# Patient Record
Sex: MALE | Race: WHITE | NOT HISPANIC OR LATINO
[De-identification: names, ages, dates, MRNs, and addresses within clinical notes are randomized per-mention and may not be internally consistent; named-entity substitution may affect disease eponyms.]

---

## 2017-02-17 DIAGNOSIS — M10.9 GOUT, UNSPECIFIED: ICD-10-CM

## 2017-02-17 DIAGNOSIS — E11.9 TYPE 2 DIABETES MELLITUS W/OUT COMPLICATIONS: ICD-10-CM

## 2017-02-17 DIAGNOSIS — I10 ESSENTIAL (PRIMARY) HYPERTENSION: ICD-10-CM

## 2017-02-17 DIAGNOSIS — E04.1 NONTOXIC SINGLE THYROID NODULE: ICD-10-CM

## 2017-02-17 DIAGNOSIS — E78.00 PURE HYPERCHOLESTEROLEMIA, UNSPECIFIED: ICD-10-CM

## 2021-03-25 ENCOUNTER — APPOINTMENT (OUTPATIENT)
Dept: OTOLARYNGOLOGY | Facility: CLINIC | Age: 61
End: 2021-03-25

## 2021-09-24 ENCOUNTER — APPOINTMENT (OUTPATIENT)
Dept: OTOLARYNGOLOGY | Facility: CLINIC | Age: 61
End: 2021-09-24
Payer: MEDICARE

## 2021-09-24 VITALS
HEART RATE: 62 BPM | BODY MASS INDEX: 41.22 KG/M2 | WEIGHT: 272 LBS | SYSTOLIC BLOOD PRESSURE: 138 MMHG | DIASTOLIC BLOOD PRESSURE: 68 MMHG | TEMPERATURE: 97 F | HEIGHT: 68 IN

## 2021-09-24 DIAGNOSIS — Z99.89 OBSTRUCTIVE SLEEP APNEA (ADULT) (PEDIATRIC): ICD-10-CM

## 2021-09-24 DIAGNOSIS — Z86.39 PERSONAL HISTORY OF OTHER ENDOCRINE, NUTRITIONAL AND METABOLIC DISEASE: ICD-10-CM

## 2021-09-24 DIAGNOSIS — Z86.69 PERSONAL HISTORY OF OTHER DISEASES OF THE NERVOUS SYSTEM AND SENSE ORGANS: ICD-10-CM

## 2021-09-24 DIAGNOSIS — Z95.5 PRESENCE OF CORONARY ANGIOPLASTY IMPLANT AND GRAFT: ICD-10-CM

## 2021-09-24 DIAGNOSIS — Z78.9 OTHER SPECIFIED HEALTH STATUS: ICD-10-CM

## 2021-09-24 DIAGNOSIS — Z87.448 PERSONAL HISTORY OF OTHER DISEASES OF URINARY SYSTEM: ICD-10-CM

## 2021-09-24 DIAGNOSIS — Z92.29 PERSONAL HISTORY OF OTHER DRUG THERAPY: ICD-10-CM

## 2021-09-24 DIAGNOSIS — I50.30 UNSPECIFIED DIASTOLIC (CONGESTIVE) HEART FAILURE: ICD-10-CM

## 2021-09-24 DIAGNOSIS — Z86.79 PERSONAL HISTORY OF OTHER DISEASES OF THE CIRCULATORY SYSTEM: ICD-10-CM

## 2021-09-24 DIAGNOSIS — E03.9 HYPOTHYROIDISM, UNSPECIFIED: ICD-10-CM

## 2021-09-24 DIAGNOSIS — G47.33 OBSTRUCTIVE SLEEP APNEA (ADULT) (PEDIATRIC): ICD-10-CM

## 2021-09-24 PROCEDURE — 69210 REMOVE IMPACTED EAR WAX UNI: CPT | Mod: RT

## 2021-09-24 PROCEDURE — 99204 OFFICE O/P NEW MOD 45 MIN: CPT | Mod: 25

## 2021-09-24 PROCEDURE — 31575 DIAGNOSTIC LARYNGOSCOPY: CPT

## 2021-09-24 RX ORDER — BENZOYL PEROXIDE 5 G/100G
5 LIQUID TOPICAL
Qty: 148 | Refills: 0 | Status: ACTIVE | COMMUNITY
Start: 2021-07-14

## 2021-09-24 RX ORDER — LEVOTHYROXINE SODIUM 0.09 MG/1
88 TABLET ORAL
Qty: 30 | Refills: 0 | Status: ACTIVE | COMMUNITY
Start: 2021-04-14

## 2021-09-24 RX ORDER — QUETIAPINE FUMARATE 25 MG/1
25 TABLET ORAL
Qty: 30 | Refills: 0 | Status: COMPLETED | COMMUNITY
Start: 2021-04-12

## 2021-09-24 RX ORDER — SENNOSIDES 8.6 MG TABLETS 8.6 MG/1
8.6 TABLET ORAL
Qty: 60 | Refills: 0 | Status: ACTIVE | COMMUNITY
Start: 2021-01-12

## 2021-09-24 RX ORDER — LANCETS 28 GAUGE
EACH MISCELLANEOUS
Refills: 0 | Status: COMPLETED | COMMUNITY

## 2021-09-24 RX ORDER — GLIMEPIRIDE 2 MG/1
2 TABLET ORAL
Refills: 0 | Status: COMPLETED | COMMUNITY

## 2021-09-24 RX ORDER — TRAZODONE HYDROCHLORIDE 100 MG/1
100 TABLET ORAL
Qty: 30 | Refills: 0 | Status: COMPLETED | COMMUNITY
Start: 2020-12-03

## 2021-09-24 RX ORDER — CHOLECALCIFEROL (VITAMIN D3) 1250 MCG
1.25 MG CAPSULE ORAL
Qty: 7 | Refills: 0 | Status: COMPLETED | COMMUNITY
Start: 2021-06-22

## 2021-09-24 RX ORDER — LEVOTHYROXINE SODIUM 0.09 MG/1
88 TABLET ORAL
Qty: 30 | Refills: 0 | Status: COMPLETED | COMMUNITY
Start: 2021-04-14

## 2021-09-24 RX ORDER — EZETIMIBE 10 MG/1
10 TABLET ORAL
Qty: 30 | Refills: 0 | Status: COMPLETED | COMMUNITY
Start: 2020-11-18

## 2021-09-24 RX ORDER — ATORVASTATIN CALCIUM 80 MG/1
80 TABLET, FILM COATED ORAL
Qty: 30 | Refills: 0 | Status: ACTIVE | COMMUNITY
Start: 2020-11-18

## 2021-09-24 RX ORDER — BUPROPION HYDROCHLORIDE 300 MG/1
300 TABLET, EXTENDED RELEASE ORAL
Qty: 30 | Refills: 0 | Status: COMPLETED | COMMUNITY
Start: 2021-04-12

## 2021-09-24 RX ORDER — LATANOPROST/PF 0.005 %
0.01 DROPS OPHTHALMIC (EYE)
Qty: 5 | Refills: 0 | Status: COMPLETED | COMMUNITY
Start: 2021-09-16

## 2021-09-24 RX ORDER — SENNOSIDES 8.6 MG TABLETS 8.6 MG/1
8.6 TABLET ORAL
Qty: 60 | Refills: 0 | Status: COMPLETED | COMMUNITY
Start: 2021-01-12

## 2021-09-24 RX ORDER — ASPIRIN 81 MG/1
81 TABLET, COATED ORAL
Qty: 30 | Refills: 0 | Status: COMPLETED | COMMUNITY
Start: 2021-05-10

## 2021-09-24 RX ORDER — TIMOLOL MALEATE 5 MG/ML
0.5 SOLUTION OPHTHALMIC
Qty: 15 | Refills: 0 | Status: COMPLETED | COMMUNITY
Start: 2021-01-07

## 2021-09-24 RX ORDER — MONTELUKAST 10 MG/1
10 TABLET, FILM COATED ORAL
Qty: 30 | Refills: 0 | Status: COMPLETED | COMMUNITY
Start: 2020-11-18

## 2021-09-24 RX ORDER — ACETAMINOPHEN 500 MG/1
500 TABLET ORAL
Qty: 30 | Refills: 0 | Status: ACTIVE | COMMUNITY
Start: 2021-09-13

## 2021-09-24 RX ORDER — LABETALOL HYDROCHLORIDE 200 MG/1
200 TABLET, FILM COATED ORAL
Qty: 60 | Refills: 0 | Status: COMPLETED | COMMUNITY
Start: 2020-09-15

## 2021-09-24 RX ORDER — BENZOYL PEROXIDE 5 G/100G
5 LIQUID TOPICAL
Qty: 148 | Refills: 0 | Status: COMPLETED | COMMUNITY
Start: 2021-07-14

## 2021-09-24 RX ORDER — FUROSEMIDE 40 MG/1
40 TABLET ORAL
Refills: 0 | Status: COMPLETED | COMMUNITY

## 2021-09-24 RX ORDER — ATORVASTATIN CALCIUM 80 MG/1
80 TABLET, FILM COATED ORAL
Qty: 30 | Refills: 0 | Status: COMPLETED | COMMUNITY
Start: 2020-11-18

## 2021-09-24 RX ORDER — MULTI VITAMIN/MINERAL SUPPLEMENT WITH ASCORBIC ACID, NIACIN, PYRIDOXINE, PANTOTHENIC ACID, FOLIC ACID, RIBOFLAVIN, THIAMIN, BIOTIN, COBALAMIN AND ZINC. 60; 20; 12.5; 10; 10; 1.7; 1.5; 1; .3; .006 MG/1; MG/1; MG/1; MG/1; MG/1; MG/1; MG/1; MG/1; MG/1; MG/1
TABLET, COATED ORAL
Qty: 30 | Refills: 0 | Status: ACTIVE | COMMUNITY
Start: 2021-03-22

## 2021-09-24 RX ORDER — FUROSEMIDE 40 MG/1
40 TABLET ORAL
Qty: 30 | Refills: 0 | Status: COMPLETED | COMMUNITY
Start: 2020-11-18

## 2021-09-24 RX ORDER — CETIRIZINE HYDROCHLORIDE 10 MG/1
10 TABLET, COATED ORAL
Refills: 0 | Status: ACTIVE | COMMUNITY

## 2021-09-24 RX ORDER — SODIUM CHLORIDE 0.65 %
0.65 AEROSOL, SPRAY (ML) NASAL
Qty: 44 | Refills: 0 | Status: ACTIVE | COMMUNITY
Start: 2021-04-14

## 2021-09-24 RX ORDER — FLUTICASONE PROPIONATE 50 UG/1
50 SPRAY, METERED NASAL
Qty: 16 | Refills: 0 | Status: COMPLETED | COMMUNITY
Start: 2021-03-08

## 2021-09-24 RX ORDER — GABAPENTIN 300 MG/1
300 CAPSULE ORAL
Refills: 0 | Status: COMPLETED | COMMUNITY

## 2021-09-24 RX ORDER — LATANOPROST/PF 0.005 %
0.01 DROPS OPHTHALMIC (EYE)
Qty: 5 | Refills: 0 | Status: ACTIVE | COMMUNITY
Start: 2021-09-16

## 2021-09-24 RX ORDER — GLIMEPIRIDE 2 MG/1
2 TABLET ORAL
Qty: 30 | Refills: 0 | Status: COMPLETED | COMMUNITY
Start: 2020-11-12

## 2021-09-24 RX ORDER — AMMONIUM LACTATE 12 %
12 CREAM (GRAM) TOPICAL
Refills: 0 | Status: ACTIVE | COMMUNITY

## 2021-09-24 RX ORDER — FLUTICASONE PROPIONATE 50 UG/1
50 SPRAY, METERED NASAL
Refills: 0 | Status: COMPLETED | COMMUNITY

## 2021-09-24 RX ORDER — CLINDAMYCIN PHOSPHATE 10 MG/ML
1 SOLUTION TOPICAL
Qty: 60 | Refills: 0 | Status: COMPLETED | COMMUNITY
Start: 2021-07-14

## 2021-09-24 RX ORDER — DOCUSATE SODIUM 100 MG/1
100 CAPSULE, LIQUID FILLED ORAL
Qty: 60 | Refills: 0 | Status: COMPLETED | COMMUNITY
Start: 2021-01-12

## 2021-09-24 RX ORDER — GABAPENTIN 300 MG/1
300 CAPSULE ORAL
Qty: 60 | Refills: 0 | Status: COMPLETED | COMMUNITY
Start: 2021-06-30

## 2021-09-24 RX ORDER — MULTIVIT-MIN/FOLIC/VIT K/LYCOP 400-300MCG
1000 TABLET ORAL
Qty: 30 | Refills: 0 | Status: ACTIVE | COMMUNITY
Start: 2021-06-30

## 2021-09-24 RX ORDER — DOCUSATE SODIUM 100 MG/1
100 CAPSULE, LIQUID FILLED ORAL
Qty: 60 | Refills: 0 | Status: ACTIVE | COMMUNITY
Start: 2021-01-12

## 2021-09-24 RX ORDER — CITALOPRAM HYDROBROMIDE 20 MG/1
20 TABLET, FILM COATED ORAL
Qty: 30 | Refills: 0 | Status: COMPLETED | COMMUNITY
Start: 2021-09-09

## 2021-09-24 RX ORDER — TIMOLOL MALEATE 5 MG/ML
0.5 SOLUTION OPHTHALMIC
Qty: 15 | Refills: 0 | Status: ACTIVE | COMMUNITY
Start: 2021-01-07

## 2021-09-24 RX ORDER — SODIUM CHLORIDE 0.65 %
0.65 AEROSOL, SPRAY (ML) NASAL
Qty: 44 | Refills: 0 | Status: COMPLETED | COMMUNITY
Start: 2021-04-14

## 2021-09-24 RX ORDER — MULTIVIT-MIN/FOLIC/VIT K/LYCOP 400-300MCG
1000 TABLET ORAL
Qty: 30 | Refills: 0 | Status: COMPLETED | COMMUNITY
Start: 2021-06-30

## 2021-09-24 RX ORDER — DOCUSATE SODIUM 100 MG/1
100 CAPSULE, LIQUID FILLED ORAL
Refills: 0 | Status: COMPLETED | COMMUNITY

## 2021-10-07 PROBLEM — E03.9 HYPOTHYROIDISM: Status: ACTIVE | Noted: 2021-10-07

## 2021-10-07 PROBLEM — Z78.9 DOES NOT USE ILLICIT DRUGS: Status: ACTIVE | Noted: 2021-10-07

## 2021-10-07 PROBLEM — I50.30 DIASTOLIC CHF: Status: RESOLVED | Noted: 2021-10-07 | Resolved: 2021-10-07

## 2021-10-07 PROBLEM — Z95.5 S/P CORONARY ARTERY STENT PLACEMENT: Status: RESOLVED | Noted: 2021-10-07 | Resolved: 2021-10-07

## 2021-10-07 PROBLEM — Z87.448 HISTORY OF CHRONIC KIDNEY DISEASE: Status: RESOLVED | Noted: 2021-10-07 | Resolved: 2021-10-07

## 2021-10-07 PROBLEM — Z86.69 HISTORY OF GLAUCOMA: Status: RESOLVED | Noted: 2021-10-07 | Resolved: 2021-10-07

## 2021-10-07 RX ORDER — FLUTICASONE PROPIONATE AND SALMETEROL 100; 50 UG/1; UG/1
100-50 POWDER RESPIRATORY (INHALATION)
Refills: 0 | Status: ACTIVE | COMMUNITY

## 2021-10-07 RX ORDER — TRAZODONE HYDROCHLORIDE 100 MG/1
100 TABLET ORAL
Refills: 0 | Status: ACTIVE | COMMUNITY
Start: 2020-12-03

## 2021-10-07 RX ORDER — GABAPENTIN 300 MG/1
300 CAPSULE ORAL TWICE DAILY
Qty: 60 | Refills: 0 | Status: ACTIVE | COMMUNITY
Start: 2021-06-30

## 2021-10-07 RX ORDER — FUROSEMIDE 40 MG/1
40 TABLET ORAL DAILY
Refills: 0 | Status: ACTIVE | COMMUNITY
Start: 2020-11-18

## 2021-10-07 RX ORDER — QUETIAPINE FUMARATE 25 MG/1
25 TABLET ORAL
Qty: 30 | Refills: 0 | Status: DISCONTINUED | COMMUNITY
Start: 2021-04-12 | End: 2021-09-24

## 2021-10-07 RX ORDER — GLIMEPIRIDE 2 MG/1
2 TABLET ORAL DAILY
Refills: 0 | Status: ACTIVE | COMMUNITY
Start: 2020-11-12

## 2021-10-07 RX ORDER — ALLOPURINOL 100 MG/1
100 TABLET ORAL DAILY
Refills: 0 | Status: ACTIVE | COMMUNITY
Start: 2020-05-14

## 2021-10-07 RX ORDER — EZETIMIBE 10 MG/1
10 TABLET ORAL DAILY
Refills: 0 | Status: ACTIVE | COMMUNITY

## 2021-10-07 NOTE — REVIEW OF SYSTEMS
[Patient Intake Form Reviewed] : Patient intake form was reviewed [Negative] : Heme/Lymph [Seasonal Allergies] : seasonal allergies [As Noted in HPI] : as noted in HPI

## 2021-10-31 PROBLEM — Z86.39 HISTORY OF HYPOTHYROIDISM: Status: RESOLVED | Noted: 2021-10-31 | Resolved: 2021-10-31

## 2021-10-31 NOTE — CONSULT LETTER
[Dear  ___] : Dear  [unfilled], [Courtesy Letter:] : I had the pleasure of seeing your patient, [unfilled], in my office today. [Please see my note below.] : Please see my note below. [Sincerely,] : Sincerely, [FreeTextEntry2] : Dr. Misty Rashid \par [address]\par [place] [id number] [FreeTextEntry3] : \par Linda Batres MD \par Otolaryngology, Head and Neck Surgery \par \par

## 2021-10-31 NOTE — DATA REVIEWED
[de-identified] : \par AUDIOGRAM (01/15/2014)\par RIGHT:  Hearing within normal limits.    \par LEFT:   Hearing within normal limits. \par Tympanograms  A  bilateral    \par Word recognition 100%  bilateral   \par

## 2021-10-31 NOTE — PHYSICAL EXAM
[Midline] : trachea located in midline position [Laryngoscopy Performed] : laryngoscopy was performed, see procedure section for findings [Normal] : no rashes [Removed] : palatine tonsils previously removed [FreeTextEntry1] : No hoarseness.  [de-identified] : No thyroid enlargement/mass.  Well-healed thyroidectomy scar. [de-identified] : RIGHT impacted cerumen removed with suction after H2O2 instillation. LEFT EAC clear. [de-identified] :  Low mid septal perforation, small, clean. [de-identified] : Mallampati  4 airway [de-identified] : s/p palatopharyngoplasty [de-identified] : Carotid pulses 2+ bilateral.

## 2021-10-31 NOTE — HISTORY OF PRESENT ILLNESS
[de-identified] : Mr. NAVARRO is a 61 year old man who presents for several ENT issues.  I last treated him in 2016.\par PMH:  HTN, HLD, DM, CAD s/p MI and PCI, diastolic CHF, CKD, hypothyroid, s/p right thyroidectomy (MNG), YANELI on CPAP, gout\par PCP:  Misty Rashid MD\par \par He is having popping noises in his ears for past 6 months, along with some muffled hearing, occasional pain, occasional\par ringing. His doctor looked in his ear and saw wax. \par He coughs up phlegm on and off for a few years and also gets irritation inside his throat.  He tried Mucinex with a little improvement.  No postnasal drip, runny nose or facial pressure.  No hoarseness.  No history of reflux.\par He was allergy tested a few years ago but not sure to what he is allergic. He is on montelukast, cetirizine and fluticasone daily.\par Was on CPAP for YANELI but needs new equipment.\par S/p right thyroid lobectomy for multinodular goiter in 2013.  Path with follicular adenoma.\par His nephew Owen practices pain management. \par \par

## 2021-10-31 NOTE — ASSESSMENT
[FreeTextEntry1] : Mr. NAVARRO was evaluated for the following issues today:\par \par 1.) Right ear clogging relieved after removal of impacted cerumen\par \par 2.) throat clearing and cough likely due to reflux\par --> reflux precautions were reviewed with him\par \par 3.) YANELI was on CPAP.  Needs new equipment.  Last sleep study over 3 years ago.\par S/p UPPP years ago.\par --> new CPAP or split night study\par \par Return in 6 months

## 2022-05-25 ENCOUNTER — NON-APPOINTMENT (OUTPATIENT)
Age: 62
End: 2022-05-25

## 2022-05-25 ENCOUNTER — APPOINTMENT (OUTPATIENT)
Dept: OTOLARYNGOLOGY | Facility: CLINIC | Age: 62
End: 2022-05-25

## 2022-05-25 VITALS
DIASTOLIC BLOOD PRESSURE: 96 MMHG | BODY MASS INDEX: 43.16 KG/M2 | HEART RATE: 56 BPM | TEMPERATURE: 96.9 F | HEIGHT: 67 IN | SYSTOLIC BLOOD PRESSURE: 180 MMHG | WEIGHT: 275 LBS

## 2022-05-25 DIAGNOSIS — H61.21 IMPACTED CERUMEN, RIGHT EAR: ICD-10-CM

## 2022-05-25 DIAGNOSIS — H93.8X1 OTHER SPECIFIED DISORDERS OF RIGHT EAR: ICD-10-CM

## 2022-05-25 DIAGNOSIS — K21.9 GASTRO-ESOPHAGEAL REFLUX DISEASE W/OUT ESOPHAGITIS: ICD-10-CM

## 2022-05-25 DIAGNOSIS — J31.2 CHRONIC PHARYNGITIS: ICD-10-CM

## 2022-05-25 PROCEDURE — 31575 DIAGNOSTIC LARYNGOSCOPY: CPT

## 2022-05-25 PROCEDURE — 99213 OFFICE O/P EST LOW 20 MIN: CPT | Mod: 25

## 2022-05-25 PROCEDURE — XXXXX: CPT | Mod: 1L

## 2022-05-25 RX ORDER — OMEPRAZOLE 20 MG/1
20 CAPSULE, DELAYED RELEASE ORAL DAILY
Qty: 30 | Refills: 2 | Status: ACTIVE | COMMUNITY
Start: 2022-05-25 | End: 1900-01-01

## 2022-05-25 RX ORDER — FAMOTIDINE 20 MG/1
20 TABLET, FILM COATED ORAL
Refills: 0 | Status: ACTIVE | COMMUNITY

## 2022-11-17 ENCOUNTER — APPOINTMENT (OUTPATIENT)
Dept: OTOLARYNGOLOGY | Facility: CLINIC | Age: 62
End: 2022-11-17

## 2022-11-17 NOTE — HISTORY OF PRESENT ILLNESS
[de-identified] : Mr. NAVARRO is a 61 year old man who presents for ENT checkup\par PMH:  HTN, HLD, DM, CAD s/p MI and PCI, diastolic CHF, CKD, hypothyroid, s/p right thyroidectomy (MNG), YANELI on CPAP, gout, chronic back pain\par PCP:  Misty Rashid MD\par \par He got a sleep study at [facility], but unsure of the results. He is scheduled for CPAP titration next month.\par He spits out a lot phlegm for a few years. His PCP started him on Famotidine.   No heartburn of stomach burning.\par His throat feels irritated and scratchy for over a year.   Often gets a tickle in his throat.  \par Every time he eats, he has a runny nose.   Currently on fluticasone spray and cetirizine.\par No changes in hearing, no nasal congestion and no postnasal drip.\par S/p right thyroid lobectomy for multinodular goiter in 2013. Path with follicular adenoma.  On LT4.\par \par VISIT 9/24/2021\par He is having popping noises in his ears for past 6 months, along with some muffled hearing, occasional pain, occasional\par ringing. His doctor looked in his ear and saw wax. \par He coughs up phlegm on and off for a few years and also gets irritation inside his throat. He tried Mucinex with a little improvement. No postnasal drip, runny nose or facial pressure. No hoarseness. No history of reflux.\par He was allergy tested a few years ago but not sure to what he is allergic. He is on montelukast, cetirizine and fluticasone daily.\par Was on CPAP for YANELI but needs new equipment.\par S/p right thyroid lobectomy for multinodular goiter in 2013. Path with follicular adenoma.\par His nephew Owen practices pain management. \par \par \par \par

## 2022-11-17 NOTE — ASSESSMENT
[FreeTextEntry1] : Mr. NAVARRO was evaluated for the following issues today:\par \par 1.) YANELI, was on CPAP in past but needed new equipment.  Recent sleep study done at [facility].\par S/p UPPP.\par --> new CPAP titration to be done\par --> request recent sleep study report\par \par 2.) throat clearing and cough x over 8 months is likely due to reflux.  Has not changed since started famotidine 2 months ago.\par --> reflux precautions were reviewed with him again\par --> continue famotidine at bedtime\par --> add omeprazole 20 mg in AM\par \par 3.) Vasomotor rhinitis\par --> continue nasal spray\par \par Return in 6 months

## 2022-11-17 NOTE — CONSULT LETTER
[Dear  ___] : Dear  [unfilled], [Courtesy Letter:] : I had the pleasure of seeing your patient, [unfilled], in my office today. [Please see my note below.] : Please see my note below. [Sincerely,] : Sincerely, [FreeTextEntry2] : Dr. Misty Rashdi \par [address]\par [place] [phone] [FreeTextEntry3] : \par Linda Batres MD \par Otolaryngology, Head and Neck Surgery \par \par

## 2022-11-17 NOTE — PHYSICAL EXAM
[] : septum deviated to the left [Midline] : trachea located in midline position [Removed] : palatine tonsils previously removed [Laryngoscopy Performed] : laryngoscopy was performed, see procedure section for findings [Normal] : no neck adenopathy [FreeTextEntry1] : No hoarseness  [de-identified] : Thyroidectomy scar healed.  No thyroid mass/nodule noted. [de-identified] : Low nasal septal perforation, clean.  [de-identified] : Dry dorsal tongue. [de-identified] : S/p UPPP

## 2022-11-17 NOTE — PROCEDURE
[FreeTextEntry6] : \par Indication:  Allergic rhinitis\par -Verbal consent was obtained from patient prior to exam. \par - Kervin-Synephrine and lidocaine 2% spray applied to nose bilaterally.\par Nasal endoscopy was performed with flexible  scope.\par Findings: \par -- Inferior turbinates mildly edematous  bilateral.  Inferior meatus clear bilateral.\par -- Septum was deviated to the left, with a low septal perforation, clean\par -- No polyps either side nose\par -- Mucus clear bilateral\par -- Middle and superior turbinates normal bilateral\par -- Middle meatus clear bilateral.  SER clear bilateral.\par -- Nasopharynx without mass or exudate\par -- Adenoids were small\par -- Eustachian orifices were clear bilateral\par \par The patient tolerated the procedure well.\par   [de-identified] : \par Indication:  globus sensation\par -Verbal consent was obtained from patient prior to procedure.\par -Kervin-Synephrine and lidocaine 2% spray applied to the nasal cavities.\par Flexible laryngoscopy was performed via right nostril and revealed the following:\par   -- Nasopharynx had no mass or exudate.  Small adenoids.\par   -- Base of tongue was symmetric and not enlarged.\par   -- Vallecula was clear\par   -- Epiglottis, both aryepiglottic folds and both false vocal folds were normal\par   -- Arytenoids both with moderate edema and erythema \par   -- True vocal folds were fully mobile and without lesions. \par   -- Post cricoid area was clear.\par   -- Interarytenoid edema was present.\par   -- No lesions seen in laryngopharynx\par The patient tolerated the procedure well.\par \par

## 2022-11-17 NOTE — HISTORY OF PRESENT ILLNESS
[de-identified] : PMH:  HTN, HLD, DM, CAD s/p MI and PCI, diastolic CHF, CKD, hypothyroid, s/p right thyroidectomy (MNG), YANELI on CPAP, gout, chronic back pain\par PCP:  Misty Rashid MD\par \par Mr. NAVARRO \par \par \par \par \par VISIT 5/25/2022\par Mr. NAVARRO is a 61 year old man who presents for ENT checkup\par He got a sleep study at [facility], but unsure of the results. He is scheduled for CPAP titration next month.\par He spits out a lot phlegm for a few years. His PCP started him on Famotidine.   No heartburn of stomach burning.\par His throat feels irritated and scratchy for over a year.   Often gets a tickle in his throat.  \par Every time he eats, he has a runny nose.   Currently on fluticasone spray and cetirizine.\par No changes in hearing, no nasal congestion and no postnasal drip.\par S/p right thyroid lobectomy for multinodular goiter in 2013. Path with follicular adenoma.  On LT4.\par \par VISIT 9/24/2021\par He is having popping noises in his ears for past 6 months, along with some muffled hearing, occasional pain, occasional\par ringing. His doctor looked in his ear and saw wax. \par He coughs up phlegm on and off for a few years and also gets irritation inside his throat. He tried Mucinex with a little improvement. No postnasal drip, runny nose or facial pressure. No hoarseness. No history of reflux.\par He was allergy tested a few years ago but not sure to what he is allergic. He is on montelukast, cetirizine and fluticasone daily.\par Was on CPAP for YANELI but needs new equipment.\par S/p right thyroid lobectomy for multinodular goiter in 2013. Path with follicular adenoma.\par His nephew Owen practices pain management. \par \par \par SLEEP STUDY (02/23/2022) at [facility]:\par - Severe YANELI ()\par - O2 saturation baseline in high 80s-mid 90s; tanna was 72%\par - One lead EKG channel revealed occasional isolated wide-complex and narrow complex premature beats.\par

## 2022-11-17 NOTE — CONSULT LETTER
[Dear  ___] : Dear  [unfilled], [Courtesy Letter:] : I had the pleasure of seeing your patient, [unfilled], in my office today. [Please see my note below.] : Please see my note below. [Sincerely,] : Sincerely, [FreeTextEntry2] : Dr. Misty Rashid \par [address]\par [place] [id number] [FreeTextEntry3] : \par Linda Batres MD \par Otolaryngology, Head and Neck Surgery \par \par

## 2022-11-17 NOTE — DATA REVIEWED
[de-identified] : \par AUDIOGRAM (01/15/2014)\par RIGHT:  Hearing within normal limits.    \par LEFT:   Hearing within normal limits. \par Tympanograms  A  bilateral    \par Word recognition 100%  bilateral   \par

## 2022-12-13 ENCOUNTER — RX ONLY (RX ONLY)
Age: 62
End: 2022-12-13

## 2022-12-13 ENCOUNTER — OFFICE (OUTPATIENT)
Dept: URBAN - METROPOLITAN AREA CLINIC 76 | Facility: CLINIC | Age: 62
Setting detail: OPHTHALMOLOGY
End: 2022-12-13
Payer: MEDICARE

## 2022-12-13 DIAGNOSIS — H40.053: ICD-10-CM

## 2022-12-13 DIAGNOSIS — E11.3311: ICD-10-CM

## 2022-12-13 DIAGNOSIS — H25.13: ICD-10-CM

## 2022-12-13 DIAGNOSIS — E11.3312: ICD-10-CM

## 2022-12-13 PROCEDURE — 99204 OFFICE O/P NEW MOD 45 MIN: CPT | Performed by: OPHTHALMOLOGY

## 2022-12-13 ASSESSMENT — CONFRONTATIONAL VISUAL FIELD TEST (CVF)
OD_FINDINGS: FULL
OS_FINDINGS: FULL

## 2022-12-13 ASSESSMENT — VISUAL ACUITY
OD_BCVA: 20/70+2
OS_BCVA: CF 1FT

## 2023-08-02 ENCOUNTER — APPOINTMENT (OUTPATIENT)
Dept: OTOLARYNGOLOGY | Facility: CLINIC | Age: 63
End: 2023-08-02

## 2023-08-23 ENCOUNTER — APPOINTMENT (OUTPATIENT)
Dept: OTOLARYNGOLOGY | Facility: CLINIC | Age: 63
End: 2023-08-23
Payer: MEDICARE

## 2023-08-23 VITALS
HEIGHT: 67 IN | HEART RATE: 65 BPM | TEMPERATURE: 97.9 F | WEIGHT: 275 LBS | DIASTOLIC BLOOD PRESSURE: 71 MMHG | SYSTOLIC BLOOD PRESSURE: 136 MMHG | BODY MASS INDEX: 43.16 KG/M2

## 2023-08-23 DIAGNOSIS — Z86.79 PERSONAL HISTORY OF OTHER DISEASES OF THE CIRCULATORY SYSTEM: ICD-10-CM

## 2023-08-23 DIAGNOSIS — J02.9 ACUTE PHARYNGITIS, UNSPECIFIED: ICD-10-CM

## 2023-08-23 DIAGNOSIS — M51.26 OTHER INTERVERTEBRAL DISC DISPLACEMENT, LUMBAR REGION: ICD-10-CM

## 2023-08-23 DIAGNOSIS — R09.89 OTHER SPECIFIED SYMPTOMS AND SIGNS INVOLVING THE CIRCULATORY AND RESPIRATORY SYSTEMS: ICD-10-CM

## 2023-08-23 DIAGNOSIS — J34.1 CYST AND MUCOCELE OF NOSE AND NASAL SINUS: ICD-10-CM

## 2023-08-23 PROCEDURE — 31231 NASAL ENDOSCOPY DX: CPT

## 2023-08-23 PROCEDURE — 99214 OFFICE O/P EST MOD 30 MIN: CPT | Mod: 25

## 2023-08-23 RX ORDER — MONTELUKAST 10 MG/1
10 TABLET, FILM COATED ORAL
Refills: 0 | Status: DISCONTINUED | COMMUNITY
End: 2023-08-23

## 2023-09-04 PROBLEM — R09.89 THROAT CLEARING: Status: ACTIVE | Noted: 2021-09-24

## 2023-09-04 PROBLEM — J34.1 MUCOUS RETENTION CYST OF MAXILLARY SINUS: Status: ACTIVE | Noted: 2023-09-04

## 2023-09-04 PROBLEM — J02.9 ACUTE PHARYNGITIS, UNSPECIFIED ETIOLOGY: Status: ACTIVE | Noted: 2023-09-04 | Resolved: 2023-10-04

## 2023-09-04 NOTE — DATA REVIEWED
[de-identified] : \par  AUDIOGRAM (01/15/2014)\par  RIGHT:  Hearing within normal limits.    \par  LEFT:   Hearing within normal limits. \par  Tympanograms  A  bilateral    \par  Word recognition 100%  bilateral   \par

## 2023-09-04 NOTE — PROCEDURE
[FreeTextEntry6] :  Indication:  chronic maxillary sinusitis -Verbal consent was obtained from patient prior to exam.  - Oxymetazoline 0.05% spray applied to nose bilaterally. Nasal endoscopy was performed with flexible scope. Findings:  -- Inferior turbinates mildly edematous & boggy bilateral.  Inferior meatus clear bilateral. -- Septum was midline, with low mid perforation -- No polyps either side nose -- Mucus clear bilateral -- Middle and superior turbinates normal bilateral -- Middle meatus clear bilateral.  SER clear bilateral. -- Nasopharynx without mass or exudate.  Adenoids were small. -- Eustachian orifices were clear bilateral  The patient tolerated the procedure well.   [de-identified] :  Indication:  pharyngitis -Verbal consent was obtained from patient prior to procedure. -Oxymetazoline 0.05% and lidocaine 2% spray applied to the nasal cavities. Flexible laryngoscopy was performed via left nostril and revealed the following:   -- Nasopharynx had no mass or exudate.   -- Right carotid artery pulsation retropharyngeal.  Cobblestoning on posterior pharyngeal wall.    -- Base of tongue was symmetric and not enlarged.   -- Vallecula was clear   -- Epiglottis, both aryepiglottic folds and both false vocal folds were normal   -- Arytenoids both with mild edema and no erythema    -- True vocal folds were fully mobile and without lesions.    -- Post cricoid area was clear.   -- Interarytenoid edema was present.   -- No lesions seen in laryngopharynx  The patient tolerated the procedure well.

## 2023-09-04 NOTE — HISTORY OF PRESENT ILLNESS
[de-identified] : PMH:  HTN, HLD, DM, CAD s/p MI and PCI, diastolic CHF, CKD, hypothyroid, s/p right thyroidectomy (MNG), YANELI on CPAP, gout, chronic back pain PCP:  Misty Rashid MD  Mr. NAVARRO is a 63-year-old man who presents for evaluation of a sinus polyp. He had MRI brain as part of evaluation for Parkinson's disease after developing a hand tremor.  The MRI showed a polyp in the left maxillary sinus. He has frequent snoring and clear nasal mucus bilateral.  No facial pain/pressure.  He has known YANELI and a septal perforation. He has scratchy irritated throat x a few days.  No fever, trouble swallowing or hoarseness. He is going to have cataract surgery soon.  VISIT [id number] He got a sleep study at [facility], but unsure of the results. He is scheduled for CPAP titration next month. He spits out a lot phlegm for a few years. His PCP started him on Famotidine.   No heartburn of stomach burning. His throat feels irritated and scratchy for over a year.   Often gets a tickle in his throat.   Every time he eats, he has a runny nose.   Currently on fluticasone spray and cetirizine. No changes in hearing, no nasal congestion and no postnasal drip. S/p right thyroid lobectomy for multinodular goiter in 2013. Path with follicular adenoma.  On LT4.  VISIT 9/24/2021 He is having popping noises in his ears for past 6 months, along with some muffled hearing, occasional pain, occasional ringing. His doctor looked in his ear and saw wax.  He coughs up phlegm on and off for a few years and also gets irritation inside his throat. He tried Mucinex with a little improvement. No postnasal drip, runny nose or facial pressure. No hoarseness. No history of reflux. He was allergy tested a few years ago but not sure to what he is allergic. He is on montelukast, cetirizine and fluticasone daily. Was on CPAP for YANELI but needs new equipment. S/p right thyroid lobectomy for multinodular goiter in 2013. Path with follicular adenoma. His nephew Owen practices pain management.    MRI BRAIN without contrast (8/10/2023) at [facility] - Comparison:  MRI brain 12/11/2004 - Mild prominence of the cerebral sulci are noted bilaterally suggesting mild volume loss in conjunction with involutional change.  Mild prominence of the extra-axial spaces is noted anterior to the frontal lobes bilaterally suggestive of mild volume loss in conjunction with involutional change. There are no additional findings to indicate an asymmetric parietal or temporal atrophy.  There is no evidence of hydrocephalus.  There is no evidence of dilatation of the lateral, third or fourth ventricles or temporal horns. - No evidence of signal elevation within the periventricular white matter. - No evidence of actue intracranial hemorrhage or subdural hematoma.  No evidence of acute teritorial infarct or acute brainstem infarct.  No evidence of intracranial mass lesions, associaed mass effect or midline shift.  No evidence of mass within the suprasellar region or craniocervical junction.  No evidence of structural or signal abrnomalities within the medial trmpoal lobes. - 2.6 cm left maxillary sinus mucosal retention cyst or polyp is noted.  A minor right maxillary sinus mucosal thickening is also noted.  No additional paranasal sinus chance to indicate opacification or air-fluid level. - Mild bilateral mastoid air cell mucosal thickening is noted. - Cavernous sinuses are symmetric.  No evidence of retro-orbital mass lesion or ocular asymmetry.  No evidence of restricted diffusion.  Trigeminal nerves are symmetric. - Right occipital-calvaria arachnoid granulation is suggested. - Examination is comparted to previous MRI brain dated 12/11/2004.  Left maxillary sinus mucosal retention cyst or polyp currently measures 2.6 cm compared to 1.7 cm on prior exam. (Images were reviewed on CD, which was returned to patient -- retention cyst is inferiorly based, does not obstruct sinus outflow.)

## 2023-09-04 NOTE — CONSULT LETTER
[Dear  ___] : Dear  [unfilled], [Courtesy Letter:] : I had the pleasure of seeing your patient, [unfilled], in my office today. [Please see my note below.] : Please see my note below. [Consult Closing:] : Thank you very much for allowing me to participate in the care of this patient.  If you have any questions, please do not hesitate to contact me. [Sincerely,] : Sincerely, [FreeTextEntry2] : Dr. Misty Rashid \par  [address]\par  [place] [id number] [FreeTextEntry3] : \par  Linda Batres MD \par  Otolaryngology, Head and Neck Surgery \par  \par

## 2023-09-04 NOTE — PHYSICAL EXAM
[FreeTextEntry1] : No hoarseness.  [de-identified] : Thyroidectomy scar well-healed.  No nodule/enlargement thyroid on palpation. [de-identified] : Smal septal perforation, clean. [Midline] : trachea located in midline position [Removed] : palatine tonsils previously removed [Laryngoscopy Performed] : laryngoscopy was performed, see procedure section for findings [de-identified] : S/p UPPP [Normal] : no neck adenopathy

## 2023-09-04 NOTE — ASSESSMENT
[FreeTextEntry1] : Mr. NAVARRO was evaluated for the following issues today:   1.)  Chronic allergic rhinitis, with inferior turbinate hypertrophy. and stable small mid low septal perforation Scratchy throat likely due to postnasal drip. --> restart fluticasone nasal spray and ipratropium spray  2.)  2.6 cm cyst in left maxillary cyst is inferiorly-based and nonobstrucive of sinus outflow; sinus has minimal mucosal thickening,  Cyst, which is asymptomatic, was 1.7 cm on 2004 MRI brain. No surgical intervention is recommended.  I would be happy to see him again as needed.

## 2023-09-20 ENCOUNTER — RX CHANGE (OUTPATIENT)
Age: 63
End: 2023-09-20

## 2023-09-20 RX ORDER — IPRATROPIUM BROMIDE 21 UG/1
0.03 SPRAY NASAL 4 TIMES DAILY
Qty: 1 | Refills: 5 | Status: DISCONTINUED | COMMUNITY
Start: 2022-05-25 | End: 2023-09-20

## 2023-09-20 RX ORDER — FLUTICASONE PROPIONATE 50 UG/1
50 SPRAY, METERED NASAL DAILY
Qty: 1 | Refills: 3 | Status: DISCONTINUED | COMMUNITY
Start: 2021-03-08 | End: 2023-09-20

## 2023-09-20 RX ORDER — IPRATROPIUM BROMIDE 21 UG/1
0.03 SPRAY NASAL
Qty: 3 | Refills: 3 | Status: ACTIVE | COMMUNITY
Start: 1900-01-01 | End: 1900-01-01

## 2024-04-24 ENCOUNTER — APPOINTMENT (OUTPATIENT)
Dept: OTOLARYNGOLOGY | Facility: CLINIC | Age: 64
End: 2024-04-24
Payer: MEDICARE

## 2024-04-24 VITALS
HEIGHT: 68 IN | WEIGHT: 245 LBS | SYSTOLIC BLOOD PRESSURE: 103 MMHG | HEART RATE: 55 BPM | TEMPERATURE: 96.8 F | BODY MASS INDEX: 37.13 KG/M2 | DIASTOLIC BLOOD PRESSURE: 66 MMHG

## 2024-04-24 DIAGNOSIS — J30.0 VASOMOTOR RHINITIS: ICD-10-CM

## 2024-04-24 DIAGNOSIS — R09.89 OTHER SPECIFIED SYMPTOMS AND SIGNS INVOLVING THE CIRCULATORY AND RESPIRATORY SYSTEMS: ICD-10-CM

## 2024-04-24 DIAGNOSIS — J34.89 OTHER SPECIFIED DISORDERS OF NOSE AND NASAL SINUSES: ICD-10-CM

## 2024-04-24 DIAGNOSIS — R09.A2 FOREIGN BODY SENSATION, THROAT: ICD-10-CM

## 2024-04-24 DIAGNOSIS — J30.89 OTHER ALLERGIC RHINITIS: ICD-10-CM

## 2024-04-24 DIAGNOSIS — G47.33 OBSTRUCTIVE SLEEP APNEA (ADULT) (PEDIATRIC): ICD-10-CM

## 2024-04-24 PROCEDURE — 31231 NASAL ENDOSCOPY DX: CPT

## 2024-04-24 PROCEDURE — 99214 OFFICE O/P EST MOD 30 MIN: CPT | Mod: 25

## 2024-04-24 RX ORDER — FLUTICASONE PROPIONATE 50 UG/1
50 SPRAY, METERED NASAL
Qty: 48 | Refills: 3 | Status: ACTIVE | COMMUNITY
Start: 1900-01-01 | End: 1900-01-01

## 2024-04-24 RX ORDER — CLINDAMYCIN PHOSPHATE 10 MG/ML
1 SOLUTION TOPICAL
Qty: 60 | Refills: 0 | Status: COMPLETED | COMMUNITY
Start: 2021-07-14 | End: 2024-04-24

## 2024-04-24 RX ORDER — CARBIDOPA AND LEVODOPA 25; 250 MG/1; MG/1
TABLET ORAL
Refills: 0 | Status: ACTIVE | COMMUNITY

## 2024-04-24 RX ORDER — CHOLECALCIFEROL (VITAMIN D3)
CRYSTALS MISCELLANEOUS
Refills: 0 | Status: COMPLETED | COMMUNITY
End: 2024-04-24

## 2024-04-28 PROBLEM — R09.A2 GLOBUS SENSATION: Status: ACTIVE | Noted: 2022-05-25

## 2024-04-28 PROBLEM — J30.89 ALLERGIC RHINITIS DUE TO OTHER ALLERGIC TRIGGER: Status: ACTIVE | Noted: 2023-08-23

## 2024-04-28 PROBLEM — J34.89 NASAL SEPTAL PERFORATION: Status: ACTIVE | Noted: 2022-05-25

## 2024-04-28 NOTE — HISTORY OF PRESENT ILLNESS
[de-identified] :  PMH: HTN, HLD, DM, CAD s/p MI and PCI, diastolic CHF, CKD, hypothyroid, s/p right thyroidectomy (MNG), YANELI on CPAP, gout, chronic back pain PCP: Misty Rashid MD  Mr. NAVARRO is here for an ENT checkup. He never got CPAP.  Severe YANELI on sleep study at [facility] in 2022. Has throat clearing and feels phlegm in throat.  Feels postnasal drip and rhinorrhea when he eats sometimes.   On PPI and H2 blocker for GERD. He was diagnosed with Parkinson disease last year, now on carbidopa-levodopa.  VISIT 8/23/2023 Mr. NAVARRO is a 63-year-old man who presents for evaluation of a sinus polyp. He had MRI brain as part of evaluation for Parkinson's disease after developing a hand tremor. The MRI showed a polyp in the left maxillary sinus. He has frequent snoring and clear nasal mucus bilateral. No facial pain/pressure. He has known YANELI and a septal perforation. He has scratchy irritated throat x a few days. No fever, trouble swallowing or hoarseness. He is going to have cataract surgery soon.  VISIT [id number] He got a sleep study at [facility], but unsure of the results. He is scheduled for CPAP titration next month. He spits out a lot phlegm for a few years. His PCP started him on Famotidine. No heartburn of stomach burning. His throat feels irritated and scratchy for over a year. Often gets a tickle in his throat. Every time he eats, he has a runny nose. Currently on fluticasone spray and cetirizine. No changes in hearing, no nasal congestion and no postnasal drip. S/p right thyroid lobectomy for multinodular goiter in 2013. Path with follicular adenoma. On LT4.  VISIT 9/24/2021 He is having popping noises in his ears for past 6 months, along with some muffled hearing, occasional pain, occasional ringing. His doctor looked in his ear and saw wax. He coughs up phlegm on and off for a few years and also gets irritation inside his throat. He tried Mucinex with a little improvement. No postnasal drip, runny nose or facial pressure. No hoarseness. No history of reflux. He was allergy tested a few years ago but not sure to what he is allergic. He is on montelukast, cetirizine and fluticasone daily. Was on CPAP for YANELI but needs new equipment. S/p right thyroid lobectomy for multinodular goiter in 2013. Path with follicular adenoma. His nephew Owen practices pain management.   MRI BRAIN without contrast (8/10/2023) at [facility] - Comparison: MRI brain 12/11/2004 - Mild prominence of the cerebral sulci are noted bilaterally suggesting mild volume loss in conjunction with involutional change. Mild prominence of the extra-axial spaces is noted anterior to the frontal lobes bilaterally suggestive of mild volume loss in conjunction with involutional change. There are no additional findings to indicate an asymmetric parietal or temporal atrophy. There is no evidence of hydrocephalus. There is no evidence of dilatation of the lateral, third or fourth ventricles or temporal horns. - No evidence of signal elevation within the periventricular white matter. - No evidence of actue intracranial hemorrhage or subdural hematoma. No evidence of acute teritorial infarct or acute brainstem infarct. No evidence of intracranial mass lesions, associaed mass effect or midline shift. No evidence of mass within the suprasellar region or craniocervical junction. No evidence of structural or signal abrnomalities within the medial trmpoal lobes. - 2.6 cm left maxillary sinus mucosal retention cyst or polyp is noted. A minor right maxillary sinus mucosal thickening is also noted. No additional paranasal sinus chance to indicate opacification or air-fluid level. - Mild bilateral mastoid air cell mucosal thickening is noted. - Cavernous sinuses are symmetric. No evidence of retro-orbital mass lesion or ocular asymmetry. No evidence of restricted diffusion. Trigeminal nerves are symmetric. - Right occipital-calvaria arachnoid granulation is suggested. - Examination is comparted to previous MRI brain dated 12/11/2004. Left maxillary sinus mucosal retention cyst or polyp currently measures 2.6 cm compared to 1.7 cm on prior exam. (Images were reviewed on CD, which was returned to patient -- retention cyst is inferiorly based, does not obstruct sinus outflow.)   SLEEP STUDY (02/23/2022) at [facility] - Severe YANELI -  - Snoring noted - O2 sat mean 89%, tanna 73%.  O2 sat +/< 88% for 139.4 minutes of sleep time. - EKG on one lead channel revealed occasional isolated wide complex and narrow complex premature beats

## 2024-04-28 NOTE — PROCEDURE
[FreeTextEntry6] : NASAL ENDOSCOPY Indication:  chronic sinusitis -Verbal consent was obtained from patient prior to exam.  - Oxymetazoline 0.05% spray applied to nose bilaterally. Nasal endoscopy was performed with flexible scope. Findings:  -- Inferior turbinate mildly edematous bilateral.  Inferior meatus clear bilateral. -- Septum was mildly deviated to the left.  Small posterior inferior septal perforation, dry, clean. -- No polyps either side nose -- Mucus clear, thick and sticky bilateral -- Middle and superior turbinate normal bilateral -- Middle meatus clear bilateral.  SER clear bilateral. The patient tolerated the procedure well.   [de-identified] : LARYNGOSCOPY EXAM:  Indication:  throat clearing -Verbal consent was obtained from patient prior to procedure. -Oxymetazoline 0.05% spray applied to the nasal cavities. Flexible laryngoscopy was performed via left nostril and revealed the following:   -- Nasopharynx had no mass or exudate.   -- Mild cobblestoning on posterior pharyngeal wall.    -- Base of tongue was symmetric and not enlarged.   -- Vallecula was clear   -- Epiglottis, both aryepiglottic folds and both false vocal folds were normal   -- Arytenoids both with mild edema and no erythema    -- True vocal folds were fully mobile and without lesions.    -- Post cricoid area was clear.  Thick clear mucus strands in pharynx.   -- Interarytenoid edema was present.   -- No lesions seen in laryngopharynx The patient tolerated the procedure well.

## 2024-04-28 NOTE — ASSESSMENT
[FreeTextEntry1] : Mr. NAVARRO was evaluated for the following problems today:  1.) YANELI severe (AHI 54) on sleep study at [facility] in 2022.  Had CPAP titration study, but I do not have report. Back in 2012, had jose given CPAP 25 cm H2O but could not tolerate.  - Needs CPAP.  Will order autoPAP from [facility] - Referral to Sleep Medicine at [facility]   2.) Chronic throat clearing and phlegm seems to be from postnasal drip.  Has component of allergic rhinitis. He has vasomotor rhinitis also. --> restart fluticasone nasal spray

## 2024-04-28 NOTE — CONSULT LETTER
[Dear  ___] : Dear  [unfilled], [Courtesy Letter:] : I had the pleasure of seeing your patient, [unfilled], in my office today. [Please see my note below.] : Please see my note below. [Consult Closing:] : Thank you very much for allowing me to participate in the care of this patient.  If you have any questions, please do not hesitate to contact me. [Sincerely,] : Sincerely, [FreeTextEntry2] : Misty Rashid MD [address] [place] [address]   [FreeTextEntry3] :  Linda Batres MD  Otolaryngology, Head and Neck Surgery

## 2024-04-28 NOTE — PHYSICAL EXAM
[Nasal Endoscopy Performed] : nasal endoscopy was performed, see procedure section for findings [Midline] : trachea located in midline position [Removed] : palatine tonsils previously removed [Laryngoscopy Performed] : laryngoscopy was performed, see procedure section for findings [Normal] : no neck adenopathy [FreeTextEntry1] : No hoarseness.  [de-identified] : No palpable thyroid nodule.  Well-healed thyroidectomy scar. [de-identified] : S/p UPPP

## 2024-04-28 NOTE — DATA REVIEWED
[de-identified] :   AUDIOGRAM (01/15/2014) RIGHT:  Hearing within normal limits.     LEFT:   Hearing within normal limits.      Tympanograms A  bilateral Word recognition 100% bilateral

## 2024-09-27 ENCOUNTER — APPOINTMENT (OUTPATIENT)
Dept: OTOLARYNGOLOGY | Facility: CLINIC | Age: 64
End: 2024-09-27

## 2024-09-27 VITALS
HEART RATE: 70 BPM | DIASTOLIC BLOOD PRESSURE: 79 MMHG | SYSTOLIC BLOOD PRESSURE: 130 MMHG | TEMPERATURE: 97.2 F | HEIGHT: 68 IN | BODY MASS INDEX: 36.83 KG/M2 | WEIGHT: 243 LBS

## 2024-09-27 DIAGNOSIS — R09.89 OTHER SPECIFIED SYMPTOMS AND SIGNS INVOLVING THE CIRCULATORY AND RESPIRATORY SYSTEMS: ICD-10-CM

## 2024-09-27 DIAGNOSIS — R09.A2 FOREIGN BODY SENSATION, THROAT: ICD-10-CM

## 2024-09-27 DIAGNOSIS — R07.0 PAIN IN THROAT: ICD-10-CM

## 2024-09-27 DIAGNOSIS — Z87.19 PERSONAL HISTORY OF OTHER DISEASES OF THE DIGESTIVE SYSTEM: ICD-10-CM

## 2024-09-27 DIAGNOSIS — G89.29 PAIN IN THROAT: ICD-10-CM

## 2024-09-27 DIAGNOSIS — K21.00 GASTRO-ESOPHAGEAL REFLUX DISEASE WITH ESOPHAGITIS, WITHOUT BLEEDING: ICD-10-CM

## 2024-09-27 PROCEDURE — 99213 OFFICE O/P EST LOW 20 MIN: CPT | Mod: 25

## 2024-09-27 PROCEDURE — 31575 DIAGNOSTIC LARYNGOSCOPY: CPT

## 2024-09-27 RX ORDER — FLUTICASONE PROPIONATE 50 UG/1
50 SPRAY, METERED NASAL DAILY
Qty: 1 | Refills: 5 | Status: ACTIVE | COMMUNITY
Start: 2024-09-27 | End: 1900-01-01

## 2024-09-27 RX ORDER — FAMOTIDINE 20 MG/1
20 TABLET, FILM COATED ORAL
Qty: 30 | Refills: 6 | Status: ACTIVE | COMMUNITY
Start: 2024-09-27 | End: 1900-01-01

## 2024-09-27 RX ORDER — OMEPRAZOLE 20 MG/1
20 CAPSULE, DELAYED RELEASE ORAL DAILY
Qty: 30 | Refills: 5 | Status: ACTIVE | COMMUNITY
Start: 2024-09-27 | End: 1900-01-01

## 2024-10-23 ENCOUNTER — RX CHANGE (OUTPATIENT)
Age: 64
End: 2024-10-23

## 2024-10-23 RX ORDER — FAMOTIDINE 20 MG/1
20 TABLET, FILM COATED ORAL
Qty: 90 | Refills: 3 | Status: ACTIVE | COMMUNITY
Start: 1900-01-01 | End: 1900-01-01

## 2024-10-23 RX ORDER — OMEPRAZOLE 20 MG/1
20 CAPSULE, DELAYED RELEASE ORAL
Qty: 90 | Refills: 3 | Status: ACTIVE | COMMUNITY
Start: 1900-01-01 | End: 1900-01-01

## 2025-02-04 PROBLEM — H61.23 BILATERAL IMPACTED CERUMEN: Status: ACTIVE | Noted: 2025-02-04

## 2025-02-04 PROBLEM — L29.9 EAR ITCHING: Status: ACTIVE | Noted: 2025-02-04

## 2025-04-25 ENCOUNTER — NON-APPOINTMENT (OUTPATIENT)
Age: 65
End: 2025-04-25

## 2025-04-25 ENCOUNTER — APPOINTMENT (OUTPATIENT)
Dept: OTOLARYNGOLOGY | Facility: CLINIC | Age: 65
End: 2025-04-25